# Patient Record
Sex: MALE | Race: WHITE
[De-identification: names, ages, dates, MRNs, and addresses within clinical notes are randomized per-mention and may not be internally consistent; named-entity substitution may affect disease eponyms.]

---

## 2022-11-02 PROBLEM — Z00.00 ENCOUNTER FOR PREVENTIVE HEALTH EXAMINATION: Status: ACTIVE | Noted: 2022-11-02

## 2022-11-23 ENCOUNTER — APPOINTMENT (OUTPATIENT)
Dept: PEDIATRIC UROLOGY | Facility: CLINIC | Age: 22
End: 2022-11-23
Payer: COMMERCIAL

## 2022-11-23 VITALS — TEMPERATURE: 98 F | WEIGHT: 127 LBS | HEIGHT: 70 IN | BODY MASS INDEX: 18.18 KG/M2

## 2022-11-23 DIAGNOSIS — Z78.9 OTHER SPECIFIED HEALTH STATUS: ICD-10-CM

## 2022-11-23 PROCEDURE — 76870 US EXAM SCROTUM: CPT

## 2022-11-23 PROCEDURE — 99204 OFFICE O/P NEW MOD 45 MIN: CPT

## 2022-11-23 PROCEDURE — 93976 VASCULAR STUDY: CPT

## 2022-11-27 NOTE — DATA REVIEWED
[FreeTextEntry1] : EXAMINATION:  US SCROTUM\par DOS  Nov 23, 2022 \par FINDINGS: LEFT VARICOCELE WITH SYMMETRIC TESTES WITH NORMAL FLOW; UNREMARKABLE OTHER SCROTAL CONTENTS

## 2022-11-27 NOTE — ASSESSMENT
[FreeTextEntry1] : MARISSA has a large left sided varicocele. I had a discussion regarding the etiology and natural history of varicoceles. We also discussed the possible effect of varicoceles on testicular growth that may have a negative effect on fertility.  A semen analysis is not possible in this case due to Cheondoism reasons.  We discussed the indications for surgery and many surgical aspects.  He does have discomfort and there is a risk of subfertility in this case.  I discussed the laparoscopic operation and the principles of the surgery.  We discussed the general anesthesia as well as the possible risks of surgery including injury to the spermatic cord, testicular atrophy, injury to other intra-abdominal or intrapelvic organs.  Bleeding and infection among others.  He will decide whether or not to proceed with surgical correction call us if he decides to do that otherwise he will return in 1 year for follow-up.

## 2022-11-27 NOTE — HISTORY OF PRESENT ILLNESS
[TextBox_4] : MARISSA is here for consultation. He is a 22 year young man who was diagnosed with a left sided varicocele 1.5 years ago by another urologist. He had not\par noted this previously and so is unsure whether it has increased in size with time. There have been no episodes of scrotal pain on either side but he feels it is "annoying". No family history of varicoceles or varicose veins. Patient reports questioning the symmetry of his testes (possibly right larger then left). \par \par \par \par

## 2022-11-27 NOTE — PHYSICAL EXAM
[Well developed] : well developed [Well nourished] : well nourished [Well appearing] : well appearing [Deferred] : deferred [Acute distress] : no acute distress [Dysmorphic] : no dysmorphic [Abnormal shape] : no abnormal shape [Ear anomaly] : no ear anomaly [Abnormal nose shape] : no abnormal nose shape [Nasal discharge] : no nasal discharge [Mouth lesions] : no mouth lesions [Eye discharge] : no eye discharge [Icteric sclera] : no icteric sclera [Labored breathing] : non- labored breathing [Rigid] : not rigid [Mass] : no mass [Hepatomegaly] : no hepatomegaly [Splenomegaly] : no splenomegaly [Palpable bladder] : no palpable bladder [RUQ Tenderness] : no ruq tenderness [LUQ Tenderness] : no luq tenderness [RLQ Tenderness] : no rlq tenderness [LLQ Tenderness] : no llq tenderness [Right tenderness] : no right tenderness [Left tenderness] : no left tenderness [Renomegaly] : no renomegaly [Right-side mass] : no right-side mass [Left-side mass] : no left-side mass [Dimple] : no dimple [Hair Tuft] : no hair tuft [Limited limb movement] : no limited limb movement [Edema] : no edema [Rashes] : no rashes [Ulcers] : no ulcers [Abnormal turgor] : normal turgor [TextBox_92] : \par Penis: Circumcised, straight without redundant skin, adhesions or skin bridges; distinct penoscrotal and penopubic junctions. Meatus orthotopic without apparent stenosis.\par Testicles: Both testes in dependent position of scrotum without masses or tenderness.\par Scrotal/Inguinal: No palpable inguinal hernias, hydroceles.  Large Grade 3  varicocele\par

## 2022-11-27 NOTE — CONSULT LETTER
[FreeTextEntry1] : Dear Dr. HAILE KEMP , \par \par I had the pleasure of consulting on MARISSA COWAN today.  Below is my note regarding the office visit today. \par \par \par Thank you so very much for allowing me to participate in MARISSA's  care.  Please don't hesitate to call me should any questions or issues arise . \par \par  \par \par Sincerely,  \par \par Deshawn \par \par \par Deshawn Salazar MD, FACS, FSPU \par Chief, Pediatric Urology \par Professor of Urology and Pediatrics \par [employer] \par \par President, [employer] - [place] Section \par Past-President, Societies for Pediatric U[employer]

## 2022-11-27 NOTE — REASON FOR VISIT
[Initial Consultation] : an initial consultation [Patient] : patient [TextBox_50] : varicocele  [TextBox_8] : Dr. Fox Braden

## 2023-01-18 ENCOUNTER — APPOINTMENT (OUTPATIENT)
Dept: PEDIATRIC UROLOGY | Facility: CLINIC | Age: 23
End: 2023-01-18
Payer: COMMERCIAL

## 2023-01-18 DIAGNOSIS — I86.1 SCROTAL VARICES: ICD-10-CM

## 2023-01-18 PROCEDURE — 99214 OFFICE O/P EST MOD 30 MIN: CPT | Mod: 95

## 2023-01-24 NOTE — ASSESSMENT
[FreeTextEntry1] : MARISSA has a large left sided varicocele. Both parents were present for the televisit.  We discussed varicoceles and the potential impact on fertility.  We then discussed the evaluation of that potential impact.  In patients who are pubertal but less than Chris 5  ultrasound is used to look at testicular symmetry.  In patients who are in the adult age group, semen analysis is a far more direct assessment of fertility potential then measurement of testicular size.  We discussed the possible Christian limitations in being able to obtain a semen analysis.  However the family seemed desirous of obtaining the studies rather than just proceeding with surgical repair.  They will speak with their rabbi for ruling.  All questions were answered.

## 2023-01-24 NOTE — HISTORY OF PRESENT ILLNESS
[Home] : at home, [unfilled] , at the time of the visit. [Medical Office: ([facility])___] : at the medical office located in  [Verbal consent obtained from patient] : the patient, [unfilled] [TextBox_4] : I verified the identity of the patient and the reason for the appointment with the parent.  I explained  to the parent that telemedicine encounters are not the same as a direct patient/healthcare provider visit because the patient and healthcare provider are not in the same room, which can result in limitations, including with the physical examination.  I explained that the telemedicine encounter may require the patient’s genitalia to be shown.  I explained that after the telemedicine encounter, the patient may require an office visit for an in-person physical examination, ultrasound or other testing.  I informed the parent that there may be privacy risks associated with the use of the technology and that there may be costs associated with the encounter. I offered the option of an office visit rather than a telemedicine encounter.   Parent stated that all explanations were understood, and that all questions were answered to their satisfaction.  The parent verbalized their preference and consent to proceed with the telemedicine encounter.\par \par MARISSA is a 22 year old man who was diagnosed with a left sided varicocele 1.5 years ago by another urologist. He had not noted this previously and so is unsure whether it has increased in size with time. There have been no episodes of scrotal pain on either side but he feels it is "annoying". No family history of varicoceles or varicose veins. Patient reports questioning the symmetry of his testes (possibly right larger then left).  Initial in office ultrasounds (Nov 2022) demonstrated a left varicocele with symmetric (16%) testicles. Returns today to discuss the laparoscopic varicocelectomy that was previously discussed. \par \par \par \par

## 2023-01-24 NOTE — CONSULT LETTER
[FreeTextEntry1] : Dear Dr. HAILE KEMP , \par \par  I had the pleasure of seeing  MARISSA COWAN for follow up today.  Below is my note regarding the office visit today. \par \par Thank you so very much for allowing me to participate in MARISSA's  care.  Please don't hesitate to call me should any questions or issues arise . \par  \par \par Sincerely,  \par \par  Deshawn \par \par Deshawn Salazar MD, FACS, FSPU \par Chief, Pediatric Urology \par Professor of Urology and Pediatrics \par [facility] [employer]  \par \par President, [employer] - [place] Section \par Past-President, Societies [employer] Pediatric Urology

## 2023-07-05 ENCOUNTER — NON-APPOINTMENT (OUTPATIENT)
Age: 23
End: 2023-07-05

## 2023-08-01 ENCOUNTER — APPOINTMENT (OUTPATIENT)
Dept: PEDIATRIC UROLOGY | Facility: HOSPITAL | Age: 23
End: 2023-08-01

## 2024-04-17 ENCOUNTER — APPOINTMENT (OUTPATIENT)
Dept: PEDIATRIC UROLOGY | Facility: CLINIC | Age: 24
End: 2024-04-17

## 2024-07-23 ENCOUNTER — NON-APPOINTMENT (OUTPATIENT)
Age: 24
End: 2024-07-23

## 2024-08-19 ENCOUNTER — APPOINTMENT (OUTPATIENT)
Dept: PEDIATRIC UROLOGY | Facility: CLINIC | Age: 24
End: 2024-08-19